# Patient Record
Sex: MALE | Race: WHITE | NOT HISPANIC OR LATINO | Employment: UNEMPLOYED | URBAN - METROPOLITAN AREA
[De-identification: names, ages, dates, MRNs, and addresses within clinical notes are randomized per-mention and may not be internally consistent; named-entity substitution may affect disease eponyms.]

---

## 2020-01-07 ENCOUNTER — TRANSFERRED RECORDS (OUTPATIENT)
Dept: HEALTH INFORMATION MANAGEMENT | Facility: CLINIC | Age: 40
End: 2020-01-07

## 2020-02-11 ENCOUNTER — TRANSFERRED RECORDS (OUTPATIENT)
Dept: HEALTH INFORMATION MANAGEMENT | Facility: CLINIC | Age: 40
End: 2020-02-11

## 2020-06-19 ENCOUNTER — TRANSFERRED RECORDS (OUTPATIENT)
Dept: HEALTH INFORMATION MANAGEMENT | Facility: CLINIC | Age: 40
End: 2020-06-19

## 2020-06-22 ENCOUNTER — TRANSCRIBE ORDERS (OUTPATIENT)
Dept: OTHER | Age: 40
End: 2020-06-22

## 2020-06-22 DIAGNOSIS — M50.30 DEGENERATIVE DISC DISEASE, CERVICAL: ICD-10-CM

## 2020-06-22 DIAGNOSIS — M47.812 CERVICAL SPONDYLOSIS: Primary | ICD-10-CM

## 2020-06-29 NOTE — TELEPHONE ENCOUNTER
DIAGNOSIS: video visit/Cervical spondylosis/Degenerative disc disease, cervical/CT scan/MRI at [facility] in [place], [place] and  in [place]/EMG/Pedro Luis Ellis MD/Shirley Barrera/bringing card/ortho con   APPOINTMENT DATE:    NOTES STATUS DETAILS   OFFICE NOTE from referring provider N/A    OFFICE NOTE from other specialist Care Everywhere    DISCHARGE SUMMARY from hospital N/A    DISCHARGE REPORT from the ER N/A    OPERATIVE REPORT N/A    MEDICATION LIST Care Everywhere    MRI recieved 9/4/2019   CT SCAN n/a    XRAYS (IMAGES & REPORTS) recieved 10/4/18  12/2/19     6/29/20  Called [facility] for imaging,no answer [facility]  Will call back shortly     6/30/20  Received imaging from [facility]

## 2020-06-30 ENCOUNTER — PRE VISIT (OUTPATIENT)
Dept: ORTHOPEDICS | Facility: CLINIC | Age: 40
End: 2020-06-30

## 2020-06-30 ENCOUNTER — VIRTUAL VISIT (OUTPATIENT)
Dept: ORTHOPEDICS | Facility: CLINIC | Age: 40
End: 2020-06-30

## 2020-06-30 VITALS — HEIGHT: 76 IN | BODY MASS INDEX: 32.88 KG/M2 | WEIGHT: 270 LBS

## 2020-06-30 DIAGNOSIS — M54.2 CERVICALGIA: Primary | ICD-10-CM

## 2020-06-30 DIAGNOSIS — M47.812 OSTEOARTHRITIS OF CERVICAL SPINE, UNSPECIFIED SPINAL OSTEOARTHRITIS COMPLICATION STATUS: ICD-10-CM

## 2020-06-30 RX ORDER — TRAMADOL HYDROCHLORIDE 50 MG/1
50 TABLET ORAL 3 TIMES DAILY
COMMUNITY
Start: 2019-12-02

## 2020-06-30 RX ORDER — CYCLOBENZAPRINE HCL 10 MG
10 TABLET ORAL PRN
COMMUNITY
Start: 2019-12-02

## 2020-06-30 RX ORDER — GABAPENTIN 100 MG/1
100 CAPSULE ORAL 3 TIMES DAILY
COMMUNITY
Start: 2019-12-02

## 2020-06-30 RX ORDER — AMITRIPTYLINE HYDROCHLORIDE 50 MG/1
25 TABLET ORAL AT BEDTIME
COMMUNITY
Start: 2020-05-19 | End: 2021-05-24

## 2020-06-30 RX ORDER — THYROID 60 MG/1
60 TABLET ORAL 3 TIMES DAILY
COMMUNITY

## 2020-06-30 RX ORDER — ROPINIROLE 5 MG/1
30 TABLET, FILM COATED ORAL DAILY
COMMUNITY

## 2020-06-30 ASSESSMENT — MIFFLIN-ST. JEOR: SCORE: 2246.21

## 2020-06-30 NOTE — PROGRESS NOTES
"Video Visit Technology for this patient: [employer] Video Visit- Patient was left in waiting room    Damian Lopez is a 39 year old male who is being evaluated via a billable video visit.      The patient has been notified of following:     \"This video visit will be conducted via a call between you and your physician/provider. We have found that certain health care needs can be provided without the need for an in-person physical exam.  This service lets us provide the care you need with a video conversation.  If a prescription is necessary we can send it directly to your pharmacy.  If lab work is needed we can place an order for that and you can then stop by our lab to have the test done at a later time.    Video visits are billed at different rates depending on your insurance coverage.  Please reach out to your insurance provider with any questions.    If during the course of the call the physician/provider feels a video visit is not appropriate, you will not be charged for this service.\"    Patient has given verbal consent for Video visit? Yes  How would you like to obtain your AVS? [employer]    Video-Visit Details    Type of service:  Video Visit    Video Start:  2:22  Video Stop:  2:48    Due to poor video connection visit finished by phone [phone]  Phone start:  2:57  Phone stop:  3:11      Subjective: 39-year male referred to orthopedic spine department [facility] for second opinion regarding cervicalgia from his primary providers in [place].  He presents to sports medicine for a virtual visit.  Chronic history of neck and upper back discomfort since at least January 2019 per chart review.  When present he will feel it in the posterior neck and upper back with episodes of radiating pain into the bilateral upper extremities that can be associated with numbness and tingling. No history of cervical spine injury.  At times pain has been debilitating with multiple visits to urgent care and " "emergency room and hospitalization for pain control 1/20/2020.  During this time has been seeing Dr Mcdaniels at a pain clinic in [place], [place] and he indicates that he has had \"8 injections in his neck\" over time.  By chart review this includes an epidural steroid injection in the cervical spine in 2019, that was felt to be helpful at the time according to chart review.  There is indication of a left-sided C2-C3 facet injection being done 4/29/2020 at the pain clinic in [place].  In addition he has had chiropractic care, physical therapy visits, traction, has used tramadol, Flexeril and gabapentin, amitryptyline at bedtime. He indicates intolerance to Topomax and Rizatriptan.  He has had trigger point injections to the neck and upper back.  Through [facility] he has consulted with physical medicine and rehabilitation, neurosurgery, rheumatology, pain clinic.    Cervical spine MRI and brain MRI through [facility] 6/27/2019 were reviewed at [facility] by physical medicine rehabilitation and neurosurgery.  Reports and images are not available to me; they are pending.  Physical medicine and rehabilitation clinic note from 8/12/2019 indicated degenerative disc changes at C5-C6, and C6-C7 with bilateral neural foraminal stenosis noted.  Patient went on to have a x-ray thoracic spine that showed mild degenerative changes at T12, but was otherwise negative.  There is a report of additional cervical spine x-ray 5/19/2020, an additional CT scan of the head 5/19/20, but results are not available to me.  He had an EMG of the bilateral upper extremities September 2019 that according to clinic notes show mild bilateral median neuropathy.    During this time period he has been also evaluated by his primary physicians and pain clinic for episodes of low back pain, as well as chest discomfort, recurrent headaches.  Chart review indicates multiple lumbar spine injections in the past. He had a normal lumbar puncture and " normal cardiac work-up during a pain control hospital admission 2020. CT ABD without contrast noncontributory 19.  Normal urinalysis 2019, mildly positive Shaina which was reviewed by Rheumatology.  He has a history of hypothyroidism on replacement medicines, felt to be due to Hashimoto's, and recent laboratory values 3/20 indicate thyroid disease with suboptimal control.  Normal CBC 2019.    Patient has been a .  Currently on disability. Plays guitar.    Past medical history.  Donated left kidney .  Hypothyroidism.    Family history: Hodgkin's disease.  Mother cerebrovascular disease.        XRAY SPINE CERVICAL 4-5 VIEWS10/  [facility]  Result Narrative   **FINAL REPORT**  Ordering Location:  [facility]    Patient Name: YAO SMITH   CSN: [id number]   : 1980   Ordering Physician: GALINA BIRMINGHAM   MRN: [id number]   MARISOL:    Accession: [id number]   Order: [id number]   Procedure: XRAY SPINE CERVICAL 4-5 VIEWS   Procedure Date/Time: 10/14/2018 null:null    EXAM:    XR Cervical Spine, 4 or 5 Views    CLINICAL HISTORY:    38 years old, male; Cervicalgia; Pain; Neck pain    TECHNIQUE:    Frontal, lateral and oblique views of the cervical spine.    COMPARISON:    No relevant prior studies available.    FINDINGS:    Vertebrae:  Unremarkable.  No acute fracture.  Normal alignment.    Disc spaces:  Mild degenerative change without fracture or subluxation.   Normal alignment.    Soft tissues:  Unremarkable.    IMPRESSION:         Mild degenerative change without fracture or subluxation. Normal alignment.  This report was electronically signed by Virtual Radiologic physician:   Aaron Villela MD. on 10/14/2018 at 18:16, Central Standard Time.           XRAY SPINE THORACIC 3 VIEWS2019  [facility]  Result Narrative      Patient Name:   YAO MSITH    YOB: 1980    Procedure: XRAY SPINE THORACIC 3 VIEWS    Date of  Service: 12/02/2019       EXAM: XRAY SPINE THORACIC 3 VIEWS    INDICATION:ICD-10 M54.6 Acute thoracic back pain, unspecified back pain laterality    COMPARISON(S):  None Available    FINDINGS:  Satisfactory alignment. Very slight chronic loss of height of the T12 vertebral body unchanged since lateral chest radiograph of 10/24/2016 with prominent osteophytes projecting anteriorly from this vertebral body and lesser osteophyte formation at the T11 and L1 levels. Mild disc space narrowing T12-L1. The other vertebral body heights and disc spaces appear satisfactorily maintained. No acute fractures or dislocations evident.    IMPRESSION:  Very slight chronic loss of height of the T12 vertebral body with moderate degenerative changes lower lumbar spine but no acute bony changes evident.           MRI SPINE LUMBAR WITHOUT CONTRAST9/4/2019  [facility]  Result Narrative      Patient Name:   YAO SMITH    YOB: 1980    Procedure: MRI SPINE LUMBAR WITHOUT CONTRAST    Date of Service: 09/04/2019       EXAM: MRI SPINE LUMBAR WITHOUT CONTRAST    INDICATION: ICD-10 R20.0 Numbness and tingling of both lower extremities  ICD-10 R20.2 Numbness and tingling of both lower extremities  ICD-10 R29.898 Left leg weakness  left quad weakness, bilateral numbness/ tingling of legs     COMPARISON: None    TECHNIQUE:  Multiplanar multisequence MR imaging of the lumbar spine was performed without IV contrast.     FINDINGS:  The conus medullaris appears satisfactory. There is decreased signal on T2 images in the disks each level from L3 through S1 indicative disc desiccation.    At the T12-L1 level the disc mildly bulges diffusely without evidence of spinal stenosis or neural foraminal stenosis.    At the L3-4 level the disc mildly protrudes across the ventral aspect of thecal sac. This mildly efface the thecal sac and mildly encroaches on the left neural foramen. The right neural foramen appears  satisfactory. Spinal stenosis is not present. Bright signal at the posterior margin of the disc centrally and on the left suggest annular tear.    At the L4-5 level the disc mildly protrudes across the ventral aspect of thecal sac. Bright signal at the posterior margin disc suggests annular tear. This protrusion does not cause significant effacement of thecal sac. Spinal stenosis or neural foraminal stenosis not thought to be present.    At the L5-S1 level there is disc space narrowing present. There is mild to moderate disc protrusion extending across the ventral aspect of thecal sac. There are associated osteophytes. These do not appear to cause significant effacement of thecal sac. Spinal stenosis is not present. There is moderate narrowing of the neural foramen bilaterally.    IMPRESSION: There is disc disease and degenerative change in the lumbar spine as discussed above.           MRI BRAIN W AND WITHOUT CONTRAST6/3/2008  [facility]  Other Result Information   Bartolo Wharton MD - 2008  3:10 PM CDT   [facility] RADIOLOGY   [place], [place]   RADIOLOGY REPORT     Facility: [facility]   Name: YAO SMITH   : Y   Order #: [id number]    Ord. Phys: CRYSTAL MCGOWAN    MRI BRAIN W AND WITHOUT CONTRAST:   2008 07:38 MRI EXAMINATION OF THE BRAIN WITH AND WITHOUT    CONTRAST:      INDICATION:    Generalized headaches for the past 2 years.      IMAGING:    The exam was conducted a 1.5 marj magnet prior to and subsequent to IV    injection of 20 cc Omniscan. Sagittal, axial and coronal images were    obtained.      FINDINGS:    There is no evidence of mass effect, edema, infarct or demyelinating    process. The brain substance appears unremarkable. There is no    abnormal enhancement of the brain or meninges. No vascular anomaly is    present.      The ventricular system is normal in caliber and position.      The orbital, parasellar, IAC,  mastoid and clivus regions all appear    within normal limits. The position of the cerebellar tonsils is    normal.      There is a large retention cyst in the floor of the left maxillary    sinus.      IMPRESSION:    Normal exam except for retention cyst in the floor of the left    maxillary sinus.          FEDERICO CAMERON M.D. [[id number]]   [facility] - [phone]     Electronically Signed by FEDERICO CAMERON M.D.    on 06/03/2008 15:10:25       [id number]   DD: 06/03/2008 14:58:33   DT: 06/03/2008 15:05:02         GENERAL: healthy, alert, without distress  EYES: Eyes grossly normal to inspection.   HENT: normal cephalic/atraumatic  NECK: No asymmetry, visible masses or scars  RESP: No audible wheeze, cough, or visible cyanosis.  No visible retractions or increased work of breathing.    SKIN: Visible skin clear. No visible rash, abnormal pigmentation or lesions.  NEURO: Cranial nerves grossly intact.  Mentation and speech appropriate for age.  PSYCH: mentation appears normal, concentration appears appropriate, judgement and insight intact and appearance well groomed  MSK: Protects his neck in forward flexion and in side to side rotation due to concerns of discomfort    A:  Cervicalgia, chronic.  Cervical spine ddd.  Chronic pain syndrome.  Chronic headaches.  Hypothyroidism.      P:  Pt and referring doctor have requested orthopedic spine consult.  Pt believes there are updated cervical spine and lumbar spine MRIs within the last 3 months from [facility].  If this is true, once these images can be transferred, the pt will see orthopedic spine in consultation. If no updated cervical spine MRI can be located, he would need a new cervical spine MRI prior to his consultation with spine surgery.  Potentially this could be done in [facility] and transferred here digitally to be available for a virtual visit with ortho spine surgery, Dr. Bethea or Dr. Woodward.  His last cervical MRI is more than one  year old.  In the meantime I encouraged him to discuss his hypothyroidism with his primary provider.  The pt indicates he is not consistently taking his thyroid medicines.  Encouraged him to readdress his chronic daily headache with his Neurologist, whom pt said he last saw 3 months ago.  Encouraged him to explore the mental health aspects of pain control offered by pain clinic in [place]. [place].

## 2020-06-30 NOTE — LETTER
"6/30/2020       RE: Damian Lopez  [address]  [place] [address]     Dear Colleague,    Thank you for referring your patient, Damian Lopez, to the [facility] at [facility]. Please see a copy of my visit note below.    .      Video Visit Technology for this patient: [employer] Video Visit- Patient was left in waiting room    Damian Lopez is a 39 year old male who is being evaluated via a billable video visit.      The patient has been notified of following:     \"This video visit will be conducted via a call between you and your physician/provider. We have found that certain health care needs can be provided without the need for an in-person physical exam.  This service lets us provide the care you need with a video conversation.  If a prescription is necessary we can send it directly to your pharmacy.  If lab work is needed we can place an order for that and you can then stop by our lab to have the test done at a later time.    Video visits are billed at different rates depending on your insurance coverage.  Please reach out to your insurance provider with any questions.    If during the course of the call the physician/provider feels a video visit is not appropriate, you will not be charged for this service.\"    Patient has given verbal consent for Video visit? Yes  How would you like to obtain your AVS? [employer]    Video-Visit Details    Type of service:  Video Visit    Video Start:  2:22  Video Stop:  2:48    Due to poor video connection visit finished by phone [phone]  Phone start:  2:57  Phone stop:  3:11      Subjective: 39-year male referred to orthopedic spine department [facility] for second opinion regarding cervicalgia from his primary providers in [place].  He presents to sports medicine for a virtual visit.  Chronic history of neck and upper back discomfort since at least January 2019 per chart review.  When present " "he will feel it in the posterior neck and upper back with episodes of radiating pain into the bilateral upper extremities that can be associated with numbness and tingling. No history of cervical spine injury.  At times pain has been debilitating with multiple visits to urgent care and emergency room and hospitalization for pain control 1/20/2020.  During this time has been seeing Dr Mcdaniels at a pain clinic in [place], [place] and he indicates that he has had \"8 injections in his neck\" over time.  By chart review this includes an epidural steroid injection in the cervical spine in 2019, that was felt to be helpful at the time according to chart review.  There is indication of a left-sided C2-C3 facet injection being done 4/29/2020 at the pain clinic in [place].  In addition he has had chiropractic care, physical therapy visits, traction, has used tramadol, Flexeril and gabapentin, amitryptyline at bedtime. He indicates intolerance to Topomax and Rizatriptan.  He has had trigger point injections to the neck and upper back.  Through [facility] he has consulted with physical medicine and rehabilitation, neurosurgery, rheumatology, pain clinic.    Cervical spine MRI and brain MRI through [facility] 6/27/2019 were reviewed at [facility] by physical medicine rehabilitation and neurosurgery.  Reports and images are not available to me; they are pending.  Physical medicine and rehabilitation clinic note from 8/12/2019 indicated degenerative disc changes at C5-C6, and C6-C7 with bilateral neural foraminal stenosis noted.  Patient went on to have a x-ray thoracic spine that showed mild degenerative changes at T12, but was otherwise negative.  There is a report of additional cervical spine x-ray 5/19/2020, an additional CT scan of the head 5/19/20, but results are not available to me.  He had an EMG of the bilateral upper extremities September 2019 that according to clinic notes show mild bilateral median " neuropathy.    During this time period he has been also evaluated by his primary physicians and pain clinic for episodes of low back pain, as well as chest discomfort, recurrent headaches.  Chart review indicates multiple lumbar spine injections in the past. He had a normal lumbar puncture and normal cardiac work-up during a pain control hospital admission 2020. CT ABD without contrast noncontributory 19.  Normal urinalysis 2019, mildly positive Shaina which was reviewed by Rheumatology.  He has a history of hypothyroidism on replacement medicines, felt to be due to Hashimoto's, and recent laboratory values 3/20 indicate thyroid disease with suboptimal control.  Normal CBC 2019.    Patient has been a .  Currently on disability. Plays [employer].    Past medical history.  Donated left kidney .  Hypothyroidism.    Family history: Hodgkin's disease.  Mother cerebrovascular disease.        XRAY SPINE CERVICAL 4-5 VIEWS10/  [facility]  Result Narrative   **FINAL REPORT**  Ordering Location:  [facility]    Patient Name: YAO SMITH   CSN: [id number]   : 1980   Ordering Physician: GALINA BIRMINGHAM   MRN: [id number]   MARISOL:    Accession: [id number]   Order: [id number]   Procedure: XRAY SPINE CERVICAL 4-5 VIEWS   Procedure Date/Time: 10/14/2018 null:null    EXAM:    XR Cervical Spine, 4 or 5 Views    CLINICAL HISTORY:    38 years old, male; Cervicalgia; Pain; Neck pain    TECHNIQUE:    Frontal, lateral and oblique views of the cervical spine.    COMPARISON:    No relevant prior studies available.    FINDINGS:    Vertebrae:  Unremarkable.  No acute fracture.  Normal alignment.    Disc spaces:  Mild degenerative change without fracture or subluxation.   Normal alignment.    Soft tissues:  Unremarkable.    IMPRESSION:         Mild degenerative change without fracture or subluxation. Normal alignment.  This report was electronically signed by Virtual Radiologic physician:    Aaron Villela MD. on 10/14/2018 at 18:16, Central Standard Time.           XRAY SPINE THORACIC 3 VIEWS12/2/2019  [facility]  Result Narrative      Patient Name:   YAO SMITH    YOB: 1980    Procedure: XRAY SPINE THORACIC 3 VIEWS    Date of Service: 12/02/2019       EXAM: XRAY SPINE THORACIC 3 VIEWS    INDICATION:ICD-10 M54.6 Acute thoracic back pain, unspecified back pain laterality    COMPARISON(S):  None Available    FINDINGS:  Satisfactory alignment. Very slight chronic loss of height of the T12 vertebral body unchanged since lateral chest radiograph of 10/24/2016 with prominent osteophytes projecting anteriorly from this vertebral body and lesser osteophyte formation at the T11 and L1 levels. Mild disc space narrowing T12-L1. The other vertebral body heights and disc spaces appear satisfactorily maintained. No acute fractures or dislocations evident.    IMPRESSION:  Very slight chronic loss of height of the T12 vertebral body with moderate degenerative changes lower lumbar spine but no acute bony changes evident.           MRI SPINE LUMBAR WITHOUT CONTRAST9/4/2019  [facility]  Result Narrative      Patient Name:   YAO SMITH    YOB: 1980    Procedure: MRI SPINE LUMBAR WITHOUT CONTRAST    Date of Service: 09/04/2019       EXAM: MRI SPINE LUMBAR WITHOUT CONTRAST    INDICATION: ICD-10 R20.0 Numbness and tingling of both lower extremities  ICD-10 R20.2 Numbness and tingling of both lower extremities  ICD-10 R29.898 Left leg weakness  left quad weakness, bilateral numbness/ tingling of legs     COMPARISON: None    TECHNIQUE:  Multiplanar multisequence MR imaging of the lumbar spine was performed without IV contrast.     FINDINGS:  The conus medullaris appears satisfactory. There is decreased signal on T2 images in the disks each level from L3 through S1 indicative disc desiccation.    At the T12-L1 level the disc mildly  bulges diffusely without evidence of spinal stenosis or neural foraminal stenosis.    At the L3-4 level the disc mildly protrudes across the ventral aspect of thecal sac. This mildly efface the thecal sac and mildly encroaches on the left neural foramen. The right neural foramen appears satisfactory. Spinal stenosis is not present. Bright signal at the posterior margin of the disc centrally and on the left suggest annular tear.    At the L4-5 level the disc mildly protrudes across the ventral aspect of thecal sac. Bright signal at the posterior margin disc suggests annular tear. This protrusion does not cause significant effacement of thecal sac. Spinal stenosis or neural foraminal stenosis not thought to be present.    At the L5-S1 level there is disc space narrowing present. There is mild to moderate disc protrusion extending across the ventral aspect of thecal sac. There are associated osteophytes. These do not appear to cause significant effacement of thecal sac. Spinal stenosis is not present. There is moderate narrowing of the neural foramen bilaterally.    IMPRESSION: There is disc disease and degenerative change in the lumbar spine as discussed above.           MRI BRAIN W AND WITHOUT CONTRAST6/3/2008  [facility]  Other Result Information   Bartolo Wharton MD - 2008  3:10 PM CDT   [facility] RADIOLOGY   [place], [place]   RADIOLOGY REPORT     Facility: [facility]   Name: YAO SMITH   : 1980/Y   Order #: [id number]    Ord. Phys: CRYSTAL MCGOWAN    MRI BRAIN W AND WITHOUT CONTRAST:   2008 07:38 MRI EXAMINATION OF THE BRAIN WITH AND WITHOUT    CONTRAST:      INDICATION:    Generalized headaches for the past 2 years.      IMAGING:    The exam was conducted a 1.5 marj magnet prior to and subsequent to IV    injection of 20 cc Omniscan. Sagittal, axial and coronal images were    obtained.      FINDINGS:    There is no evidence of mass  effect, edema, infarct or demyelinating    process. The brain substance appears unremarkable. There is no    abnormal enhancement of the brain or meninges. No vascular anomaly is    present.      The ventricular system is normal in caliber and position.      The orbital, parasellar, IAC, mastoid and clivus regions all appear    within normal limits. The position of the cerebellar tonsils is    normal.      There is a large retention cyst in the floor of the left maxillary    sinus.      IMPRESSION:    Normal exam except for retention cyst in the floor of the left    maxillary sinus.          FEDERICO CAMERON M.D. [[id number]]   [facility] - [phone]     Electronically Signed by FEDERICO CAMERON M.D.    on 06/03/2008 15:10:25       [id number]   DD: 06/03/2008 14:58:33   DT: 06/03/2008 15:05:02         GENERAL: healthy, alert, without distress  EYES: Eyes grossly normal to inspection.   HENT: normal cephalic/atraumatic  NECK: No asymmetry, visible masses or scars  RESP: No audible wheeze, cough, or visible cyanosis.  No visible retractions or increased work of breathing.    SKIN: Visible skin clear. No visible rash, abnormal pigmentation or lesions.  NEURO: Cranial nerves grossly intact.  Mentation and speech appropriate for age.  PSYCH: mentation appears normal, concentration appears appropriate, judgement and insight intact and appearance well groomed  MSK: Protects his neck in forward flexion and in side to side rotation due to concerns of discomfort    A:  Cervicalgia, chronic.  Cervical spine ddd.  Chronic pain syndrome.  Chronic headaches.  Hypothyroidism.      P:  Pt and referring doctor have requested orthopedic spine consult.  Pt believes there are updated cervical spine and lumbar spine MRIs within the last 3 months from [facility].  If this is true, once these images can be transferred, the pt will see orthopedic spine in consultation. If no updated cervical spine MRI can be located, he  would need a new cervical spine MRI prior to his consultation with spine surgery.  Potentially this could be done in [facility] and transferred here digitally to be available for a virtual visit with ortho spine surgery, Dr. Bethea or Dr. Woodward.  His last cervical MRI is more than one year old.  In the meantime I encouraged him to discuss his hypothyroidism with his primary provider.  The pt indicates he is not consistently taking his thyroid medicines.  Encouraged him to readdress his chronic daily headache with his Neurologist, whom pt said he last saw 3 months ago.  Encouraged him to explore the mental health aspects of pain control offered by pain clinic in [place]. [place].          Again, thank you for allowing me to participate in the care of your patient.      Sincerely,    Ned Lindsay MD

## 2020-06-30 NOTE — LETTER
"  6/30/2020      RE: Damian Lopez  [address]  [facility] [address]       .      Video Visit Technology for this patient: AmWell Video Visit- Patient was left in waiting room    Damian Lopez is a 39 year old male who is being evaluated via a billable video visit.      The patient has been notified of following:     \"This video visit will be conducted via a call between you and your physician/provider. We have found that certain health care needs can be provided without the need for an in-person physical exam.  This service lets us provide the care you need with a video conversation.  If a prescription is necessary we can send it directly to your pharmacy.  If lab work is needed we can place an order for that and you can then stop by our lab to have the test done at a later time.    Video visits are billed at different rates depending on your insurance coverage.  Please reach out to your insurance provider with any questions.    If during the course of the call the physician/provider feels a video visit is not appropriate, you will not be charged for this service.\"    Patient has given verbal consent for Video visit? Yes  How would you like to obtain your AVS? Candy Labhart    Video-Visit Details    Type of service:  Video Visit    Video Start:  2:22  Video Stop:  2:48    Due to poor video connection visit finished by phone [phone]  Phone start:  2:57  Phone stop:  3:11      Subjective: 39-year male referred to orthopedic spine department [facility] for second opinion regarding cervicalgia from his primary providers in [place].  He presents to sports medicine for a virtual visit.  Chronic history of neck and upper back discomfort since at least January 2019 per chart review.  When present he will feel it in the posterior neck and upper back with episodes of radiating pain into the bilateral upper extremities that can be associated with numbness and tingling. No history of cervical spine " "injury.  At times pain has been debilitating with multiple visits to urgent care and emergency room and hospitalization for pain control 1/20/2020.  During this time has been seeing Dr Mcdaniels at a pain clinic in [place], [place] and he indicates that he has had \"8 injections in his neck\" over time.  By chart review this includes an epidural steroid injection in the cervical spine in 2019, that was felt to be helpful at the time according to chart review.  There is indication of a left-sided C2-C3 facet injection being done 4/29/2020 at the pain clinic in [place].  In addition he has had chiropractic care, physical therapy visits, traction, has used tramadol, Flexeril and gabapentin, amitryptyline at bedtime. He indicates intolerance to Topomax and Rizatriptan.  He has had trigger point injections to the neck and upper back.  Through [facility] he has consulted with physical medicine and rehabilitation, neurosurgery, rheumatology, pain clinic.    Cervical spine MRI and brain MRI through [facility] 6/27/2019 were reviewed at [facility] by physical medicine rehabilitation and neurosurgery.  Reports and images are not available to me; they are pending.  Physical medicine and rehabilitation clinic note from 8/12/2019 indicated degenerative disc changes at C5-C6, and C6-C7 with bilateral neural foraminal stenosis noted.  Patient went on to have a x-ray thoracic spine that showed mild degenerative changes at T12, but was otherwise negative.  There is a report of additional cervical spine x-ray 5/19/2020, an additional CT scan of the head 5/19/20, but results are not available to me.  He had an EMG of the bilateral upper extremities September 2019 that according to clinic notes show mild bilateral median neuropathy.    During this time period he has been also evaluated by his primary physicians and pain clinic for episodes of low back pain, as well as chest discomfort, recurrent headaches.  Chart review " indicates multiple lumbar spine injections in the past. He had a normal lumbar puncture and normal cardiac work-up during a pain control hospital admission 2020. CT ABD without contrast noncontributory 19.  Normal urinalysis 2019, mildly positive Shaina which was reviewed by Rheumatology.  He has a history of hypothyroidism on replacement medicines, felt to be due to Hashimoto's, and recent laboratory values 3/20 indicate thyroid disease with suboptimal control.  Normal CBC 2019.    Patient has been a .  Currently on disability. Plays [employer].    Past medical history.  Donated left kidney .  Hypothyroidism.    Family history: Hodgkin's disease.  Mother cerebrovascular disease.        XRAY SPINE CERVICAL 4-5 VIEWS10/  [facility]  Result Narrative   **FINAL REPORT**  Ordering Location:  [facility]    Patient Name: YAO SMITH   CSN: [id number]   : 1980   Ordering Physician: GALINA BIRMINGHAM   MRN: [id number]   MARISOL:    Accession: [id number]   Order: [id number]   Procedure: XRAY SPINE CERVICAL 4-5 VIEWS   Procedure Date/Time: 10/14/2018 null:null    EXAM:    XR Cervical Spine, 4 or 5 Views    CLINICAL HISTORY:    38 years old, male; Cervicalgia; Pain; Neck pain    TECHNIQUE:    Frontal, lateral and oblique views of the cervical spine.    COMPARISON:    No relevant prior studies available.    FINDINGS:    Vertebrae:  Unremarkable.  No acute fracture.  Normal alignment.    Disc spaces:  Mild degenerative change without fracture or subluxation.   Normal alignment.    Soft tissues:  Unremarkable.    IMPRESSION:         Mild degenerative change without fracture or subluxation. Normal alignment.  This report was electronically signed by Virtual Radiologic physician:   Aaron Villela MD. on 10/14/2018 at 18:16, Central Standard Time.           XRAY SPINE THORACIC 3 VIEWS2019  [facility]  Result Narrative      Patient Name:   YAO SMITH  JOSE    YOB: 1980    Procedure: XRAY SPINE THORACIC 3 VIEWS    Date of Service: 12/02/2019       EXAM: XRAY SPINE THORACIC 3 VIEWS    INDICATION:ICD-10 M54.6 Acute thoracic back pain, unspecified back pain laterality    COMPARISON(S):  None Available    FINDINGS:  Satisfactory alignment. Very slight chronic loss of height of the T12 vertebral body unchanged since lateral chest radiograph of 10/24/2016 with prominent osteophytes projecting anteriorly from this vertebral body and lesser osteophyte formation at the T11 and L1 levels. Mild disc space narrowing T12-L1. The other vertebral body heights and disc spaces appear satisfactorily maintained. No acute fractures or dislocations evident.    IMPRESSION:  Very slight chronic loss of height of the T12 vertebral body with moderate degenerative changes lower lumbar spine but no acute bony changes evident.           MRI SPINE LUMBAR WITHOUT CONTRAST9/4/2019  [facility]  Result Narrative      Patient Name:   YAO SMITH    YOB: 1980    Procedure: MRI SPINE LUMBAR WITHOUT CONTRAST    Date of Service: 09/04/2019       EXAM: MRI SPINE LUMBAR WITHOUT CONTRAST    INDICATION: ICD-10 R20.0 Numbness and tingling of both lower extremities  ICD-10 R20.2 Numbness and tingling of both lower extremities  ICD-10 R29.898 Left leg weakness  left quad weakness, bilateral numbness/ tingling of legs     COMPARISON: None    TECHNIQUE:  Multiplanar multisequence MR imaging of the lumbar spine was performed without IV contrast.     FINDINGS:  The conus medullaris appears satisfactory. There is decreased signal on T2 images in the disks each level from L3 through S1 indicative disc desiccation.    At the T12-L1 level the disc mildly bulges diffusely without evidence of spinal stenosis or neural foraminal stenosis.    At the L3-4 level the disc mildly protrudes across the ventral aspect of thecal sac. This mildly efface the thecal  sac and mildly encroaches on the left neural foramen. The right neural foramen appears satisfactory. Spinal stenosis is not present. Bright signal at the posterior margin of the disc centrally and on the left suggest annular tear.    At the L4-5 level the disc mildly protrudes across the ventral aspect of thecal sac. Bright signal at the posterior margin disc suggests annular tear. This protrusion does not cause significant effacement of thecal sac. Spinal stenosis or neural foraminal stenosis not thought to be present.    At the L5-S1 level there is disc space narrowing present. There is mild to moderate disc protrusion extending across the ventral aspect of thecal sac. There are associated osteophytes. These do not appear to cause significant effacement of thecal sac. Spinal stenosis is not present. There is moderate narrowing of the neural foramen bilaterally.    IMPRESSION: There is disc disease and degenerative change in the lumbar spine as discussed above.           MRI BRAIN W AND WITHOUT CONTRAST6/3/2008  [facility]  Other Result Information   Bartolo Wharton MD - 2008  3:10 PM CDT   [facility] RADIOLOGY   [place], [place]   RADIOLOGY REPORT     Facility: [facility]   Name: YAO SMITH   : 1980/27Y   Order #: [id number]    Ord. Phys: CRYSTAL MCGOWAN    MRI BRAIN W AND WITHOUT CONTRAST:   2008 07:38 MRI EXAMINATION OF THE BRAIN WITH AND WITHOUT    CONTRAST:      INDICATION:    Generalized headaches for the past 2 years.      IMAGING:    The exam was conducted a 1.5 marj magnet prior to and subsequent to IV    injection of 20 cc Omniscan. Sagittal, axial and coronal images were    obtained.      FINDINGS:    There is no evidence of mass effect, edema, infarct or demyelinating    process. The brain substance appears unremarkable. There is no    abnormal enhancement of the brain or meninges. No vascular anomaly is    present.      The  ventricular system is normal in caliber and position.      The orbital, parasellar, IAC, mastoid and clivus regions all appear    within normal limits. The position of the cerebellar tonsils is    normal.      There is a large retention cyst in the floor of the left maxillary    sinus.      IMPRESSION:    Normal exam except for retention cyst in the floor of the left    maxillary sinus.          FEDERICO CAMERON M.D. [[id number]]   [facility] - [phone]     Electronically Signed by FEDERICO CAMERON M.D.    on 06/03/2008 15:10:25       [id number]//[id number]   DD: 06/03/2008 14:58:33   DT: 06/03/2008 15:05:02         GENERAL: healthy, alert, without distress  EYES: Eyes grossly normal to inspection.   HENT: normal cephalic/atraumatic  NECK: No asymmetry, visible masses or scars  RESP: No audible wheeze, cough, or visible cyanosis.  No visible retractions or increased work of breathing.    SKIN: Visible skin clear. No visible rash, abnormal pigmentation or lesions.  NEURO: Cranial nerves grossly intact.  Mentation and speech appropriate for age.  PSYCH: mentation appears normal, concentration appears appropriate, judgement and insight intact and appearance well groomed  MSK: Protects his neck in forward flexion and in side to side rotation due to concerns of discomfort    A:  Cervicalgia, chronic.  Cervical spine ddd.  Chronic pain syndrome.  Chronic headaches.  Hypothyroidism.      P:  Pt and referring doctor have requested orthopedic spine consult.  Pt believes there are updated cervical spine and lumbar spine MRIs within the last 3 months from [facility].  If this is true, once these images can be transferred, the pt will see orthopedic spine in consultation. If no updated cervical spine MRI can be located, he would need a new cervical spine MRI prior to his consultation with spine surgery.  Potentially this could be done in [facility] and transferred here digitally to be available for a virtual visit with  ortho spine surgery, Dr. Bethea or Dr. Woodward.  His last cervical MRI is more than one year old.  In the meantime I encouraged him to discuss his hypothyroidism with his primary provider.  The pt indicates he is not consistently taking his thyroid medicines.  Encouraged him to readdress his chronic daily headache with his Neurologist, whom pt said he last saw 3 months ago.  Encouraged him to explore the mental health aspects of pain control offered by pain clinic in [place]. [place].          Ned Lindsay MD

## 2020-07-16 ENCOUNTER — VIRTUAL VISIT (OUTPATIENT)
Dept: ORTHOPEDICS | Facility: CLINIC | Age: 40
End: 2020-07-16

## 2020-07-16 DIAGNOSIS — M54.2 CERVICALGIA: Primary | ICD-10-CM

## 2020-07-16 DIAGNOSIS — M48.02 CERVICAL STENOSIS OF SPINE: ICD-10-CM

## 2020-07-16 DIAGNOSIS — R20.0 BILATERAL HAND NUMBNESS: ICD-10-CM

## 2020-07-16 DIAGNOSIS — M47.812 CERVICAL SPONDYLOSIS: ICD-10-CM

## 2020-07-16 NOTE — PROGRESS NOTES
"Patient is evaluated today via billable video visit.    The patient has been notified of following:   \"This video visit will be conducted via a call between you and your physician/provider. We have found that certain health care needs can be provided without the need for an in-person physical exam.  This service lets us provide the care you need with a video conversation.  If a prescription is necessary we can send it directly to your pharmacy.  If lab work is needed we can place an order for that and you can then stop by our lab to have the test done at a later time.  If during the course of the call the physician/provider feels a video visit is not appropriate, you will not be charged for this service.\"     Physician has received verbal consent for a Video Visit from the patient.  Video software/leona used:  [employer]  Video time:  3:10pm to 3:42pm  Originating Location (pt. Location): Home  Distant Location (provider location):  home       REASON FOR CONSULTATION: Consult (Cervicalgia)     REFERRING PHYSICIAN: Ned Lindsay   PCP:No primary care provider on file.    History of Present Illness:  40/m, ambidextrous, referred by Dr. Lindsay primarily for neck pain.    From Dr. Lindsay's note (6/30/2020), patient had already undergone significant and exhaustive nonoperative treatment, with evaluations by different specialties, and continues to have significant disabling pain:  - Per patient, has had approximately 8 injections into his neck, performed by Dr. Mcdaniels at a pain clinic in [facility].  This includes a cervical RAJ in 2019.  Also left C2-3 facet injection on 4/29/2020.  -Has had trigger point injections to the neck and upper back.  -Had been seen by PM&R, neurosurgery, rheumatology and pain clinic.  - Has had multiple visits to urgent care and emergency room, with hospitalization for pain control on 1/20/2020.  - Has also had chiropractic care, physical therapy, traction.  - Has used tramadol, " "ibuprofen, Flexeril, gabapentin and amitriptyline.  Indicates intolerance to Topamax and Rizatriptan.    Per patient, had been experiencing significant neck pain x at least 1 year.  No trauma; no precipitating incident.  Came about gradually.  Getting worse over time.    Neck 70%, Arm 30% - tingling and numbness of bilateral fingertips to wrists.  Occasionally (3x over the past year), his hands/fingers would bend/cramp in an awkward position.  When he tries to move his neck, feels like there is gravel in there.  Worse:  Neck motion - also causes headaches.  Better:  nothing    Previous treament:  See above.  Per patient, the very first injection seemed to have helped signficantly, although worn off by the following day.  None of the abovementioned meds help.  Had previously tried opioids, but they did not help either; so currently not on opioids.    PSHx:  Had done construction and concrete x 25 years.  Had to stop working early December 2019 because of current symptoms (neck and back).  Was on short-term disability until 2 weeks ago.  Trying to get into long-term disability.  , lives with wife and 3 children (ages 12, 9 and 7 mos).  Started smoking about a month ago; approx 3-4 cigarettes/day.  Hardly ever drink EtOH.    PMHx:  (+) Hashimoto's      Neck Disability Index (NDI) Questionnaire    Neck Disability Index (NDI) 7/16/2020   Neck Disability Index: Count 10   NDI: Total Score = SUM (points for all 10 findings) 37   Neck Disability in Percent = (Total Score) / 50 * 100 74 (%)        ROS: A 12-point review of systems was completed and is negative except for otherwise noted above in the history of present illness.    Med Hx:  No past medical history on file.    Surg Hx:  No past surgical history on file.    Allergies:  Allergies   Allergen Reactions     Topiramate Other (See Comments)     \"almost had stroke the last time I took it\"     Rizatriptan Other (See Comments)     Loss of consciousness resulting " in hospital admission       Meds:  Current Outpatient Medications   Medication     amitriptyline (ELAVIL) 50 MG tablet     cyclobenzaprine (FLEXERIL) 10 MG tablet     gabapentin (NEURONTIN) 100 MG capsule     OMEPRAZOLE PO     rOPINIRole (REQUIP) 5 MG tablet     thyroid (ARMOUR) 60 MG tablet     traMADol (ULTRAM) 50 MG tablet     No current facility-administered medications for this visit.        Fam Hx:  No family history on file.    P/S Hx:  Social History     Tobacco Use     Smoking status: Current Every Day Smoker     Smokeless tobacco: Never Used   Substance Use Topics     Alcohol use: Not on file       Physical Examination:    This was a video visit, so very limited exam could be performed.  On video, patient appeared alert, oriented x 3, cooperative, with coherent speech, and not in cardiorespiratory distress.  Able to respond to questions appropriately and follow instructions.          Imaging:   Cervical MRI from 2/25/2020 reviewed.  Mild multilevel cervical spondylosis, with mild spinal stenosis C5-6 and C6-7, milder at C3-4.  However, difficult to tell if this is symptomatic or not.  There is no spinal cord deformation; no cord signal change or myelomalacia.    Thoracic and lumbar MRI from 2/25/2020 reviewed.  Shows diffuse spondylosis or degenerative changes in the thoracic and lumbar spine.  However, no significant stenosis or evidence of spinal cord or nerve root impingement.    Cervical bone scan (nuclear medicine) from April 2020 reviewed.  Images available in PACS; report however is not available.  It would seem that there is increased tracer uptake in the cervical spine.  Am not sure whether this is within normal limits or would be considered abnormal.  Patient thinks he's had a cervical CT in January at an ER in [facility].    Cervical AP lateral, right and left oblique, and odontoid view x-rays from 2017 reviewed.  These show degenerative changes or spondylosis, without evidence of fracture or  instability.  No significant deformity.    Impression:   1.  Chronic severe neck pain and stiffness, with headaches and bilateral hand tingling/numbness.  2.  Mild multilevel cervical spondylosis and stenosis, without evidence of spinal cord compression.    Plan:   Had a discussion with patient and wife.  He has had symptoms for over a year, progressively worsening, currently very disabling, had not worked since December, and does not think he is able to return to work with his previous level of symptoms.  At same time, feels that he has already tried and exhausted nonoperative treatment, including multiple injections, seeing many different providers and specialties, undergoing significant work-up.  Despite all these, continues to have significant disabling pain.    Unfortunately, given information at hand, I am unable to pinpoint exactly the cause of his ongoing pain.  MRI shows some findings including some degeneration of the disc, endplate inflammation, mild stenosis.  However these findings fail in comparison to the severity of his symptoms.  As such, I am unable to tell if these MRI findings are related to his ongoing symptoms.  I am also not certain if we are missing something in his case.    - Retrieve imaging reports from MRI scans (Feb 2020) and bone scan (April 2020).  - Retrieve cervical CT from [facility] Jan 2020.  - Retrieve injection reports from Dr. Chintan Mcdaniels (Pain Clinic, [facility]).  - Cervical ap-lat and flex-ext x-rays; may be done locally.  Patient lives in [facility], about 4 hours away.    Patient would like to make sure that he sorts out his insurance coverage before undergoing any more tests, as he cannot afford to pay for them himself.  Also looking into long-term disability application.  He asked me about this.  Unfortunately, I am likewise not familiar with this process.  I would be willing to try to help him as I could but it may be limited.  I told him that typically the  primary care physicians are more familiar with the process of applying for disability.    RTC virtual prn once above results obtained, so we could review and discuss potential next steps.    All questions and concerns were answered to the patient's apparent satisfaction before leaving the clinic.   Total visit time > 30 mins, > 50% counseling and coordination of care.    Respectfully,    Keith Bethea MD    Orthopaedic Spine Surgery  Dept Orthopaedic Surgery, [facility]  [phone] office, [phone] pager  [email or web address]

## 2020-07-16 NOTE — LETTER
"    7/16/2020         RE: Damian Lopez  [address] [facility] [address]        Dear Colleague,    Thank you for referring your patient, Damian Lopez, to the [facility]. Please see a copy of my visit note below.    Patient is evaluated today via billable video visit.    The patient has been notified of following:   \"This video visit will be conducted via a call between you and your physician/provider. We have found that certain health care needs can be provided without the need for an in-person physical exam.  This service lets us provide the care you need with a video conversation.  If a prescription is necessary we can send it directly to your pharmacy.  If lab work is needed we can place an order for that and you can then stop by our lab to have the test done at a later time.  If during the course of the call the physician/provider feels a video visit is not appropriate, you will not be charged for this service.\"     Physician has received verbal consent for a Video Visit from the patient.  Video software/leona used:  TRUECar  Video time:  3:10pm to 3:42pm  Originating Location (pt. Location): Home  Distant Location (provider location):  home       REASON FOR CONSULTATION: Consult (Cervicalgia)     REFERRING PHYSICIAN: Ned Lindsay   PCP:No primary care provider on file.    History of Present Illness:  40/m, ambidextrous, referred by Dr. Lindsay primarily for neck pain.    From Dr. Lindsay's note (6/30/2020), patient had already undergone significant and exhaustive nonoperative treatment, with evaluations by different specialties, and continues to have significant disabling pain:  - Per patient, has had approximately 8 injections into his neck, performed by Dr. Mcdaniels at a pain clinic in [facility].  This includes a cervical RAJ in 2019.  Also left C2-3 facet injection on 4/29/2020.  -Has had trigger point injections to the neck and upper back.  -Had been seen by PM&R, " neurosurgery, rheumatology and pain clinic.  - Has had multiple visits to urgent care and emergency room, with hospitalization for pain control on 1/20/2020.  - Has also had chiropractic care, physical therapy, traction.  - Has used tramadol, ibuprofen, Flexeril, gabapentin and amitriptyline.  Indicates intolerance to Topamax and Rizatriptan.    Per patient, had been experiencing significant neck pain x at least 1 year.  No trauma; no precipitating incident.  Came about gradually.  Getting worse over time.    Neck 70%, Arm 30% - tingling and numbness of bilateral fingertips to wrists.  Occasionally (3x over the past year), his hands/fingers would bend/cramp in an awkward position.  When he tries to move his neck, feels like there is gravel in there.  Worse:  Neck motion - also causes headaches.  Better:  nothing    Previous treament:  See above.  Per patient, the very first injection seemed to have helped signficantly, although worn off by the following day.  None of the abovementioned meds help.  Had previously tried opioids, but they did not help either; so currently not on opioids.    PSHx:  Had done construction and concrete x 25 years.  Had to stop working early December 2019 because of current symptoms (neck and back).  Was on short-term disability until 2 weeks ago.  Trying to get into long-term disability.  , lives with wife and 3 children (ages 12, 9 and 7 mos).  Started smoking about a month ago; approx 3-4 cigarettes/day.  Hardly ever drink EtOH.    PMHx:  (+) Hashimoto's      Neck Disability Index (NDI) Questionnaire    Neck Disability Index (NDI) 7/16/2020   Neck Disability Index: Count 10   NDI: Total Score = SUM (points for all 10 findings) 37   Neck Disability in Percent = (Total Score) / 50 * 100 74 (%)        ROS: A 12-point review of systems was completed and is negative except for otherwise noted above in the history of present illness.    Med Hx:  No past medical history on file.    Surg  "Hx:  No past surgical history on file.    Allergies:  Allergies   Allergen Reactions     Topiramate Other (See Comments)     \"almost had stroke the last time I took it\"     Rizatriptan Other (See Comments)     Loss of consciousness resulting in hospital admission       Meds:  Current Outpatient Medications   Medication     amitriptyline (ELAVIL) 50 MG tablet     cyclobenzaprine (FLEXERIL) 10 MG tablet     gabapentin (NEURONTIN) 100 MG capsule     OMEPRAZOLE PO     rOPINIRole (REQUIP) 5 MG tablet     thyroid (ARMOUR) 60 MG tablet     traMADol (ULTRAM) 50 MG tablet     No current facility-administered medications for this visit.        Fam Hx:  No family history on file.    P/S Hx:  Social History     Tobacco Use     Smoking status: Current Every Day Smoker     Smokeless tobacco: Never Used   Substance Use Topics     Alcohol use: Not on file       Physical Examination:    This was a video visit, so very limited exam could be performed.  On video, patient appeared alert, oriented x 3, cooperative, with coherent speech, and not in cardiorespiratory distress.  Able to respond to questions appropriately and follow instructions.          Imaging:   Cervical MRI from 2/25/2020 reviewed.  Mild multilevel cervical spondylosis, with mild spinal stenosis C5-6 and C6-7, milder at C3-4.  However, difficult to tell if this is symptomatic or not.  There is no spinal cord deformation; no cord signal change or myelomalacia.    Thoracic and lumbar MRI from 2/25/2020 reviewed.  Shows diffuse spondylosis or degenerative changes in the thoracic and lumbar spine.  However, no significant stenosis or evidence of spinal cord or nerve root impingement.    Cervical bone scan (nuclear medicine) from April 2020 reviewed.  Images available in PACS; report however is not available.  It would seem that there is increased tracer uptake in the cervical spine.  Am not sure whether this is within normal limits or would be considered abnormal.  Patient " thinks he's had a cervical CT in January at an ER in [facility].    Cervical AP lateral, right and left oblique, and odontoid view x-rays from 2017 reviewed.  These show degenerative changes or spondylosis, without evidence of fracture or instability.  No significant deformity.    Impression:   1.  Chronic severe neck pain and stiffness, with headaches and bilateral hand tingling/numbness.  2.  Mild multilevel cervical spondylosis and stenosis, without evidence of spinal cord compression.    Plan:   Had a discussion with patient and wife.  He has had symptoms for over a year, progressively worsening, currently very disabling, had not worked since December, and does not think he is able to return to work with his previous level of symptoms.  At same time, feels that he has already tried and exhausted nonoperative treatment, including multiple injections, seeing many different providers and specialties, undergoing significant work-up.  Despite all these, continues to have significant disabling pain.    Unfortunately, given information at hand, I am unable to pinpoint exactly the cause of his ongoing pain.  MRI shows some findings including some degeneration of the disc, endplate inflammation, mild stenosis.  However these findings fail in comparison to the severity of his symptoms.  As such, I am unable to tell if these MRI findings are related to his ongoing symptoms.  I am also not certain if we are missing something in his case.    - Retrieve imaging reports from MRI scans (Feb 2020) and bone scan (April 2020).  - Retrieve cervical CT from [facility] Jan 2020.  - Retrieve injection reports from Dr. Chintan Mcdaniels (Pain Clinic, [facility]).  - Cervical ap-lat and flex-ext x-rays; may be done locally.  Patient lives in [place], [place] hours away.    Patient would like to make sure that he sorts out his insurance coverage before undergoing any more tests, as he cannot afford to pay for them himself.  Also  looking into long-term disability application.  He asked me about this.  Unfortunately, I am likewise not familiar with this process.  I would be willing to try to help him as I could but it may be limited.  I told him that typically the primary care physicians are more familiar with the process of applying for disability.    RTC virtual prn once above results obtained, so we could review and discuss potential next steps.    All questions and concerns were answered to the patient's apparent satisfaction before leaving the clinic.   Total visit time > 30 mins, > 50% counseling and coordination of care.    Respectfully,    Keith Bethea MD    Orthopaedic Spine Surgery  Dept Orthopaedic Surgery, [facility]  [phone] office, [phone] pager  [email or web address]

## 2020-07-16 NOTE — NURSING NOTE
Reason For Visit:   Chief Complaint   Patient presents with     Consult     Cervicalgia       Primary MD: No primary care provider on file.    ?  No  Occupation Construction   Currently working? No.  Work status?  On disability.  Date of injury: Dec 2019 - started as neck & back, had RAJ. Didn't help in neck, only numbed. In back, has improved. Neck - unable to do lateral motion.     Date of surgery: NA  Type of surgery: NA   Smoker: Yes - started again a month ago.   Request smoking cessation information: No    There were no vitals taken for this visit.    Pain Assessment  Patient Currently in Pain: Yes  0-10 Pain Scale: 7  Primary Pain Location: Neck    Oswestry (BEENA) Questionnaire    No flowsheet data found.     Neck Disability Index (NDI) Questionnaire    Neck Disability Index (NDI) 7/16/2020   Neck Disability Index: Count 10   NDI: Total Score = SUM (points for all 10 findings) 37   Neck Disability in Percent = (Total Score) / 50 * 100 74 (%)       Promis 10 Assessment    PROMIS 10 7/16/2020   In general, would you say your health is: Fair   In general, would you say your quality of life is: Poor   In general, how would you rate your physical health? Fair   In general, how would you rate your mental health, including your mood and your ability to think? Good   In general, how would you rate your satisfaction with your social activities and relationships? Poor   In general, please rate how well you carry out your usual social activities and roles Poor   To what extent are you able to carry out your everyday physical activities such as walking, climbing stairs, carrying groceries, or moving a chair? A little   How often have you been bothered by emotional problems such as feeling anxious, depressed or irritable? Often   How would you rate your fatigue on average? Moderate   How would you rate your pain on average?   0 = No Pain  to  10 = Worst Imaginable Pain 8   In general, would you say your health  is: 2   In general, would you say your quality of life is: 1   In general, how would you rate your physical health? 2   In general, how would you rate your mental health, including your mood and your ability to think? 3   In general, how would you rate your satisfaction with your social activities and relationships? 1   In general, please rate how well you carry out your usual social activities and roles. (This includes activities at home, at work and in your community, and responsibilities as a parent, child, spouse, employee, friend, etc.) 1   To what extent are you able to carry out your everyday physical activities such as walking, climbing stairs, carrying groceries, or moving a chair? 2   In the past 7 days, how often have you been bothered by emotional problems such as feeling anxious, depressed, or irritable? 4   In the past 7 days, how would you rate your fatigue on average? 3   In the past 7 days, how would you rate your pain on average, where 0 means no pain, and 10 means worst imaginable pain? 8   Global Mental Health Score 7   Global Physical Health Score 9   PROMIS TOTAL - SUBSCORES 16      Leanne Huynh ATC

## 2020-09-01 ENCOUNTER — TELEPHONE (OUTPATIENT)
Dept: ORTHOPEDICS | Facility: CLINIC | Age: 40
End: 2020-09-01

## 2020-09-01 NOTE — TELEPHONE ENCOUNTER
[facility] Call Center    Phone Message    May a detailed message be left on voicemail: yes     Reason for Call: Other: [facility]     Action Taken: Other: [facility]    Travel Screening: Not Applicable

## 2020-10-01 ENCOUNTER — VIRTUAL VISIT (OUTPATIENT)
Dept: ORTHOPEDICS | Facility: CLINIC | Age: 40
End: 2020-10-01
Payer: COMMERCIAL

## 2020-10-01 DIAGNOSIS — M48.02 CERVICAL STENOSIS OF SPINE: ICD-10-CM

## 2020-10-01 DIAGNOSIS — M47.812 CERVICAL SPONDYLOSIS: Primary | ICD-10-CM

## 2020-10-01 PROCEDURE — 99214 OFFICE O/P EST MOD 30 MIN: CPT | Mod: 95 | Performed by: ORTHOPAEDIC SURGERY

## 2020-10-01 NOTE — PROGRESS NOTES
"VIDEO VISIT:  Patient is evaluated today via billable video visit.    The patient has been notified of following:   \"This video visit will be conducted via a call between you and your physician/provider. We have found that certain health care needs can be provided without the need for an in-person physical exam.  This service lets us provide the care you need with a video conversation.  If a prescription is necessary we can send it directly to your pharmacy.  If lab work is needed we can place an order for that and you can then stop by our lab to have the test done at a later time.  If during the course of the call the physician/provider feels a video visit is not appropriate, you will not be charged for this service.\"     Physician has received verbal consent for a Video Visit from the patient.  Video software/leona used:  [employer]  Video time:  9:30am to 9:55am  Originating Location (pt. Location): [place]  Distant Location (provider location):  [facility] [place]     Chief Complaint   Patient presents with     RECHECK     Discuss neck pain. Patient came to [place] to [facility] to have virtual visit.        Last Visit Date: 7/16/20  Previous Impression:  1.  Chronic severe neck pain and stiffness, with headaches and bilateral hand tingling/numbness.  2.  Mild multilevel cervical spondylosis and stenosis, without evidence of spinal cord compression.  Previous Plan:  Unfortunately, given information at hand, I am unable to pinpoint exactly the cause of his ongoing pain.  MRI shows some findings including some degeneration of the disc, endplate inflammation, mild stenosis.  However these findings fail in comparison to the severity of his symptoms.  As such, I am unable to tell if these MRI findings are related to his ongoing symptoms.  I am also not certain if we are missing something in his case.  - Retrieve imaging reports from MRI scans (Feb 2020) and bone scan (April 2020).  - " Retrieve cervical CT from [facility] Jan 2020.  - Retrieve injection reports from Dr. Chintan Mcdaniels (Pain Clinic, [facility]).  - Cervical ap-lat and flex-ext x-rays; may be done locally.  Patient lives in [place] [place] away.  Patient would like to make sure that he sorts out his insurance coverage before undergoing any more tests, as he cannot afford to pay for them himself.  Also looking into long-term disability application.  He asked me about this.  Unfortunately, I am likewise not familiar with this process.  I would be willing to try to help him as I could but it may be limited.  I told him that typically the primary care physicians are more familiar with the process of applying for disability.  RTC virtual prn once above results obtained, so we could review and discuss potential next steps.      S>  40 year old male, here for f/u.    Accompanied by wife.  Essentially similar symptoms as last visit.  If anything, he says symptoms continue to progress and get worse over time.  He is very unhappy.  He has also had symptoms for more than a year, and has failed exhaustive nonoperative treatment, including physical therapy, multiple injections, seeing different providers, and extensive work-up.    Continues to have significant neck and bilateral arm pain.  Neck 70%, arms 30%.    Saw rheumatologist (Dr. Chip Hubbard) with [facility] in [place], [place].  Per patient, Dr. Hubbard noted on PE that he had decreased sensation on the right side (leg, hip).    Per patient, he had seen a couple of neurologists already, and all they could diagnose him with was carpal tunnel syndrome, which does not explain his leg symptoms.    Currently taking ibuprofen 800 mg prn, helps somewhat.  Gabapentin.  Does not want opioids; took it before, made him loopy.      Neck Disability Index (NDI) Questionnaire    Neck Disability Index (NDI) 10/1/2020   Neck Disability Index: Count 10   NDI: Total Score = SUM (points for all 10  findings) 38   Neck Disability in Percent = (Total Score) / 50 * 100 76 (%)      Previous visit NDI  74%      Visual Analog Pain Scale  Neck Pain Scale 0-10: 7  Right arm pain: 3  Left arm pain: 3    PROMIS-10 Scores  Global Mental Health Score: (P) 9  Global Physical Health Score: (P) 10  PROMIS TOTAL - SUBSCORES: (P) 19    Physical Examination:    This was a video visit, so very limited exam could be performed.  On video, patient appeared alert, oriented x 3, cooperative, with coherent speech, and not in cardiorespiratory distress.  Able to respond to questions appropriately and follow instructions.    Imaging:    Cervical AP, lateral and flexion-extension views from 9/14/2020 uploaded to our PACS system and reviewed.  Shows multilevel degenerative changes or spondylosis, most advanced at C5-6 and C6-7 levels, with disc space narrowing, some endplate irregularity and osteophyte formation.  However, no evidence of instability.  No significant deformity.  No fracture or dislocation.  Overall, these are nonspecific findings that may or may not be symptomatic.    Assessment:    1.  Chronic severe neck pain and stiffness, with headaches and bilateral hand tingling/numbness, and right leg numbness.  2.  Mild multilevel cervical spondylosis and stenosis, without evidence of spinal cord compression.    Plan:    I had a good discussion with patient and his wife.  At his last visit, I outlined in my plan several things that would need to be retrieved.  It should be noted [place] and he has had extensive work-up already performed, but all of these were outside our health system.  Thus, they were not readily available to me.    Today, he returns, but none of the records that I have wanted retrieved had been retrieved.  Specifically, I have not seen any report pertaining to his cervical MRI from February 2020, nor a report pertaining to his bone scan from April 2020, nor any reports from injections  performed by Dr. Chintan Mcdaniels.  The only thing he had done among the different things I outlined, were cervical x-rays which I reviewed today.  However, these findings were nonspecific and did not necessarily tell us what or where the problem is, and whether there is a good surgical option available to him.    We will thus need to still retrieve all these things that I had wanted.  I apologized to the patient.  He should not be the one held responsible for retrieving these reports.  Our records gathering team should be able to retrieve these for us.    - Retrieve:    - Report of MRI scans from 2/25/2020 (images already in PACS).    - Report of bone scan from 4/14/2020 (images already in PACS).    - Injection reports from Dr. Chintan Mcdaniels (Pain Clinic, [facility]).  - Will also reach out to [facility] and ask if they could provide some reading on the MRI and bone scan images.  - Re disability paperwork, I advised that this continue to go through his PCP or non-surgical provider, as we had not performed any surgery on him (yet).  Once surgery is performed, then the surgeon could take over the responsibility re disability paperwork.    Will reach back out to patient re above and further recs.    TT 25 mins, > 50% CC.      Keith Bethea MD    Orthopaedic Spine Surgery  Dept Orthopaedic Surgery, [facility]  [phone] office, [phone] pager  [email or web address]

## 2020-10-01 NOTE — NURSING NOTE
Reason For Visit:   Chief Complaint   Patient presents with     RECHECK     Discuss neck pain. Patient came to [place] to [facility] to have virtual visit.        Primary MD: No primary care provider on file.      There were no vitals taken for this visit.    Pain Assessment  Patient Currently in Pain: Yes  0-10 Pain Scale: 7  Primary Pain Location: Neck      Neck Disability Index (NDI) Questionnaire    Neck Disability Index (NDI) 10/1/2020   Neck Disability Index: Count 10   NDI: Total Score = SUM (points for all 10 findings) 38   Neck Disability in Percent = (Total Score) / 50 * 100 76 (%)              Visual Analog Pain Scale  Neck Pain Scale 0-10: 7  Right arm pain: 3  Left arm pain: 3    Promis 10 Assessment    PROMIS 10 10/1/2020   In general, would you say your health is: Good   In general, would you say your quality of life is: Fair   In general, how would you rate your physical health? Good   In general, how would you rate your mental health, including your mood and your ability to think? Fair   In general, how would you rate your satisfaction with your social activities and relationships? Fair   In general, please rate how well you carry out your usual social activities and roles Good   To what extent are you able to carry out your everyday physical activities such as walking, climbing stairs, carrying groceries, or moving a chair? Moderately   How often have you been bothered by emotional problems such as feeling anxious, depressed or irritable? Sometimes   How would you rate your fatigue on average? Severe   How would you rate your pain on average?   0 = No Pain  to  10 = Worst Imaginable Pain 8   In general, would you say your health is: 3   In general, would you say your quality of life is: 2   In general, how would you rate your physical health? 3   In general, how would you rate your mental health, including your mood and your ability to think? 2   In general, how would you rate your  satisfaction with your social activities and relationships? 2   In general, please rate how well you carry out your usual social activities and roles. (This includes activities at home, at work and in your community, and responsibilities as a parent, child, spouse, employee, friend, etc.) 3   To what extent are you able to carry out your everyday physical activities such as walking, climbing stairs, carrying groceries, or moving a chair? 3   In the past 7 days, how often have you been bothered by emotional problems such as feeling anxious, depressed, or irritable? 3   In the past 7 days, how would you rate your fatigue on average? 4   In the past 7 days, how would you rate your pain on average, where 0 means no pain, and 10 means worst imaginable pain? 8   Global Mental Health Score 9   Global Physical Health Score 10   PROMIS TOTAL - SUBSCORES 19                Hayley Glaser LPN

## 2020-10-01 NOTE — LETTER
"    10/1/2020         RE: Damian Lopez  [address]  [facility] [address]        Dear Colleague,    Thank you for referring your patient, Damian Lopez, to the [facility]. Please see a copy of my visit note below.    VIDEO VISIT:  Patient is evaluated today via billable video visit.    The patient has been notified of following:   \"This video visit will be conducted via a call between you and your physician/provider. We have found that certain health care needs can be provided without the need for an in-person physical exam.  This service lets us provide the care you need with a video conversation.  If a prescription is necessary we can send it directly to your pharmacy.  If lab work is needed we can place an order for that and you can then stop by our lab to have the test done at a later time.  If during the course of the call the physician/provider feels a video visit is not appropriate, you will not be charged for this service.\"     Physician has received verbal consent for a Video Visit from the patient.  Video software/leona used:  Gametime  Video time:  9:30am to 9:55am  Originating Location (pt. Location): Home  Distant Location (provider location):  [facility]     Chief Complaint   Patient presents with     RECHECK     Discuss neck pain. Patient came to [place] to [facility] to have virtual visit.        Last Visit Date: 7/16/20  Previous Impression:  1.  Chronic severe neck pain and stiffness, with headaches and bilateral hand tingling/numbness.  2.  Mild multilevel cervical spondylosis and stenosis, without evidence of spinal cord compression.  Previous Plan:  Unfortunately, given information at hand, I am unable to pinpoint exactly the cause of his ongoing pain.  MRI shows some findings including some degeneration of the disc, endplate inflammation, mild stenosis.  However these findings fail in comparison to the " severity of his symptoms.  As such, I am unable to tell if these MRI findings are related to his ongoing symptoms.  I am also not certain if we are missing something in his case.  - Retrieve imaging reports from MRI scans (Feb 2020) and bone scan (April 2020).  - Retrieve cervical CT from [facility] Jan 2020.  - Retrieve injection reports from Dr. Chintan Mcdaniels (Pain Clinic, [facility]).  - Cervical ap-lat and flex-ext x-rays; may be done locally.  Patient lives in [place], about 4 hours away.  Patient would like to make sure that he sorts out his insurance coverage before undergoing any more tests, as he cannot afford to pay for them himself.  Also looking into long-term disability application.  He asked me about this.  Unfortunately, I am likewise not familiar with this process.  I would be willing to try to help him as I could but it may be limited.  I told him that typically the primary care physicians are more familiar with the process of applying for disability.  RTC virtual prn once above results obtained, so we could review and discuss potential next steps.      S>  40 year old male, here for f/u.    Accompanied by wife.  Essentially similar symptoms as last visit.  If anything, he says symptoms continue to progress and get worse over time.  He is very unhappy.  He has also had symptoms for more than a year, and has failed exhaustive nonoperative treatment, including physical therapy, multiple injections, seeing different providers, and extensive work-up.    Continues to have significant neck and bilateral arm pain.  Neck 70%, arms 30%.    Saw rheumatologist (Dr. Chip Hubbard) with [facility] in [place], [place].  Per patient, Dr. Hubbard noted on PE that he had decreased sensation on the right side (leg, hip).    Per patient, he had seen a couple of neurologists already, and all they could diagnose him with was carpal tunnel syndrome, which does not explain his leg symptoms.    Currently taking ibuprofen  800 mg prn, helps somewhat.  Gabapentin.  Does not want opioids; took it before, made him loopy.      Neck Disability Index (NDI) Questionnaire    Neck Disability Index (NDI) 10/1/2020   Neck Disability Index: Count 10   NDI: Total Score = SUM (points for all 10 findings) 38   Neck Disability in Percent = (Total Score) / 50 * 100 76 (%)      Previous visit NDI  74%      Visual Analog Pain Scale  Neck Pain Scale 0-10: 7  Right arm pain: 3  Left arm pain: 3    PROMIS-10 Scores  Global Mental Health Score: (P) 9  Global Physical Health Score: (P) 10  PROMIS TOTAL - SUBSCORES: (P) 19    Physical Examination:    This was a video visit, so very limited exam could be performed.  On video, patient appeared alert, oriented x 3, cooperative, with coherent speech, and not in cardiorespiratory distress.  Able to respond to questions appropriately and follow instructions.    Imaging:    Cervical AP, lateral and flexion-extension views from 9/14/2020 uploaded to our PACS system and reviewed.  Shows multilevel degenerative changes or spondylosis, most advanced at C5-6 and C6-7 levels, with disc space narrowing, some endplate irregularity and osteophyte formation.  However, no evidence of instability.  No significant deformity.  No fracture or dislocation.  Overall, these are nonspecific findings that may or may not be symptomatic.    Assessment:    1.  Chronic severe neck pain and stiffness, with headaches and bilateral hand tingling/numbness, and right leg numbness.  2.  Mild multilevel cervical spondylosis and stenosis, without evidence of spinal cord compression.    Plan:    I had a good discussion with patient and his wife.  At his last visit, I outlined in my plan several things that would need to be retrieved.  It should be noted [place], and he has had extensive work-up already performed, but all of these were outside our health system.  Thus, they were not readily available to me.    Today, he  returns, but none of the records that I have wanted retrieved had been retrieved.  Specifically, I have not seen any report pertaining to his cervical MRI from February 2020, nor a report pertaining to his bone scan from April 2020, nor any reports from injections performed by Dr. Chintan Mcdaniels.  The only thing he had done among the different things I outlined, were cervical x-rays which I reviewed today.  However, these findings were nonspecific and did not necessarily tell us what or where the problem is, and whether there is a good surgical option available to him.    We will thus need to still retrieve all these things that I had wanted.  I apologized to the patient.  He should not be the one held responsible for retrieving these reports.  Our records gathering team should be able to retrieve these for us.    - Retrieve:    - Report of MRI scans from 2/25/2020 (images already in PACS).    - Report of bone scan from 4/14/2020 (images already in PACS).    - Injection reports from Dr. Chintan Mcdaniels (Pain Clinic, [facility]).  - Will also reach out to [facility] and ask if they could provide some reading on the MRI and bone scan images.  - Re disability paperwork, I advised that this continue to go through his PCP or non-surgical provider, as we had not performed any surgery on him (yet).  Once surgery is performed, then the surgeon could take over the responsibility re disability paperwork.    Will reach back out to patient re above and further recs.    TT 25 mins, > 50% CC.      Keith Bethea MD    Orthopaedic Spine Surgery  Dept Orthopaedic Surgery, [facility]  [phone] office, [phone] pager  [email or web address]

## 2021-01-04 ENCOUNTER — HEALTH MAINTENANCE LETTER (OUTPATIENT)
Age: 41
End: 2021-01-04

## 2021-03-26 ENCOUNTER — MYC MEDICAL ADVICE (OUTPATIENT)
Dept: ORTHOPEDICS | Facility: CLINIC | Age: 41
End: 2021-03-26

## 2021-03-30 NOTE — TELEPHONE ENCOUNTER
See My Chart message & dictations of July 2020 & 10-1-2020 for plan.    I called pt & wife & let them know we have the imaging from [facility] of 3 Spine MRI's  2-25-20 & Bone scan of 4- but we still have not received the reports &  wants our Radiology staff here at [facility] to do a Read on the outside Cervical MRI & bone scan reports & then he needs a RTN appt with  to again discuss plan.  I told them  we are still allowed to do out of state Virtual appt with [facility] until JUne but dont know if that will change per Insurance.  We also need the  Imaging & reports of all the injections he has had with  pain clinic.  I asked them to sign QUYNH at both places to have all above sent to us & I provided  Our contact info.    They will F/U to check when we receive above & then make RTN appt.  They agreed.  Call back prn.  They agreed.  W.O./Celia Lundberg RN.

## 2021-05-25 ENCOUNTER — VIRTUAL VISIT (OUTPATIENT)
Dept: ORTHOPEDICS | Facility: CLINIC | Age: 41
End: 2021-05-25

## 2021-05-25 DIAGNOSIS — M47.816 LUMBAR SPONDYLOSIS: ICD-10-CM

## 2021-05-25 DIAGNOSIS — M47.812 CERVICAL SPONDYLOSIS: Primary | ICD-10-CM

## 2021-05-25 PROCEDURE — 99215 OFFICE O/P EST HI 40 MIN: CPT | Mod: 95 | Performed by: ORTHOPAEDIC SURGERY

## 2021-05-25 NOTE — LETTER
"    5/25/2021         RE: Damian Lopez  [address]  [facility] [address]        Dear Colleague,    Thank you for referring your patient, Damian Lopez, to the [facility]. Please see a copy of my visit note below.    VIRTUAL VISIT:  Patient is evaluated today via billable virtual visit.    The patient has been notified of following:   \"This virtual visit will be conducted via a call between you and your physician/provider. We have found that certain health care needs can be provided without the need for an in-person physical exam.  This service lets us provide the care you need with a virtual conversation.  If a prescription is necessary we can send it directly to your pharmacy.  If lab work is needed we can place an order for that and you can then stop by our lab to have the test done at a later time.  If during the course of the call the physician/provider feels a virtual visit is not appropriate, you will not be charged for this service.\"     Physician has received verbal consent for a Virtual Visit from the patient.  Platform used:  [email or web address] Video  Time:  3:48pm to 4:27pm  Originating Location (pt. Location): Home  Distant Location (provider location):  [facility]       Chief Complaint   Patient presents with     RECHECK     Neck pain and numbness. Records all in [employer].        Last Visit Date: 10/1/20  Previous Impression:  1.  Chronic severe neck pain and stiffness, with headaches and bilateral hand tingling/numbness, and right leg numbness.  2.  Mild multilevel cervical spondylosis and stenosis, without evidence of spinal cord compression.  Previous Plan:  Today, he returns, but none of the records that I have wanted retrieved had been retrieved.  Specifically, I have not seen any report pertaining to his cervical MRI from February 2020, nor a report pertaining to his bone scan from April 2020, nor any reports from injections " performed by Dr. Chintan Mcdaniels.  The only thing he had done among the different things I outlined, were cervical x-rays which I reviewed today.  However, these findings were nonspecific and did not necessarily tell us what or where the problem is, and whether there is a good surgical option available to him.  We will thus need to still retrieve all these things that I had wanted.  I apologized to the patient.  He should not be the one held responsible for retrieving these reports.  Our records gathering team should be able to retrieve these for us.  - Retrieve:    - Report of MRI scans from 2/25/2020 (images already in PACS).    - Report of bone scan from 4/14/2020 (images already in PACS).    - Injection reports from Dr. Chintan Mcdaniels (Pain Clinic, [facility]).  - Will also reach out to [facility] and ask if they could provide some reading on the MRI and bone scan images.  - Re disability paperwork, I advised that this continue to go through his PCP or non-surgical provider, as we had not performed any surgery on him (yet).  Once surgery is performed, then the surgeon could take over the responsibility re disability paperwork.  Will reach back out to patient re above and further recs.      S>  40 year old male, ambidextrous, here for f/u.    Neck pain 6/10. R>L.  Also with bad migraines (last attack a couple of weeks ago).    Low back pain 7/10.  Says is back pain has also been going on this whole time.  L>R.    If he walks for ~ 2 hours then sits down, then he could not stand back up because he could not move his legs L>R.    Per patient, he used to be very active.  He used to work in construction and welding.  He had to stop work about a couple of years ago because of ongoing symptoms.  Now, his quality of life is very poor.  He does not think he could go back to the same level of activity.    Injections c/o Dr. Mcdaniels:  12/17/19: C5-6 IL RAJ.  1/7/20: Bilateral SIJ injections with  steroid.    Tramadol  Cyclobenzaprine  Gabapentin  Amitryptiline  Ibuprofen 800  Used to be on hydrocodone, but stopped because it made him feel sick.  Tried morphine in the past, but it kept him awake.  Currently not on opioids.    Currently not following with anyone.  Had not seen Dr. Mcdaniels since Feb 2020.    PSHx:  Lives with wife.  Had not worked in ~ 2yrs.  Currently on disability check.  Used to work in construction and welding.      Neck Disability Index (NDI) Questionnaire    Neck Disability Index (NDI) 5/25/2021   Neck Disability Index: Count 10   NDI: Total Score = SUM (points for all 10 findings) 37   Neck Disability in Percent = (Total Score) / 50 * 100 74 (%)      NDI 10/1/20 76%      Visual Analog Pain Scale  Neck Pain Scale 0-10: 6  Right arm pain: 0  Left arm pain: 0    PROMIS-10 Scores  Global Mental Health Score: (P) 12  Global Physical Health Score: (P) 8  PROMIS TOTAL - SUBSCORES: (P) 20    Physical Examination:    This was a virtual visit, so very limited exam could be performed.  Patient seemed alert, oriented x 3, cooperative, with coherent speech, and not in distress.  Able to respond to questions appropriately and follow instructions.    Imaging:    No new imaging obtained today.    Assessment:    1.  Chronic severe neck pain and stiffness, with headaches and bilateral hand tingling/numbness, and right leg numbness.  2.  Mild multilevel cervical spondylosis and stenosis, without evidence of spinal cord compression.    Plan:    Had a good long discussion with patient and wife.  We again reviewed his previous imaging studies, including cervical, thoracic and lumbar MRI scans, x-rays, and cervical bone scan.  Unfortunately, after all these, I still really do not know exactly where his pain is/are coming from, and thus cannot recommend any specific surgical procedure that would likely be of benefit.    At the same time, I understand that he is miserable and desperate.  He has not worked in  more than 2 years, living on disability, and has a very poor quality of life.  He has pain both in his neck and his low back.  He also feels he has already exhausted all nonoperative treatment, including physical therapy, multiple injections and medications.  He had also been evaluated by rheumatology and neurology, and they do not have anything to offer.  Also very frustrating for the patient and his wife is the fact that nobody could give them a specific diagnosis or explain exactly what is going on in his body.    His cervical MRI shows some inflammatory changes left L2-3 facet joint.  This also shows increased tracer uptake on the bone scan.  However, his neck pain is more on the right side.  Thus, this finding is likely to be asymptomatic, and therefore performing a fusion at this level may not lead to significant benefit.    At the same time, his lumbar MRI shows advanced spondylosis with inflammatory changes at L5-S1 right greater than left.  However, his back symptoms are more on the left side.  Thus, I do not think that going after the L5-S1 level will be of significant benefit either.    The only thing I could think of is to repeat the advanced imaging studies, as these are already more than 1 year old.  I cannot guarantee that we will see something that would be clearly amenable to surgery.  However, I would like to leave no stone), given his level of desperation, and the fact that his quality of life is so poor.  He also has not had previous CT scans, so we would order these as well.    - Lumbar MRI and CT.  - Cervical MRI and CT.  He prefers to have the studies done locally.  We will have the images sent to us.    Virtual RTC afterwards to review and discuss further options.  At next visit, will need to fill out both cervical and lumbar questionnaires.  TT > 40 mins.      Keith Bethea MD    Orthopaedic Spine Surgery  Dept Orthopaedic Surgery, [facility]  [phone]  office, [phone] pager  [email or web address]

## 2021-05-26 ENCOUNTER — TELEPHONE (OUTPATIENT)
Dept: ORTHOPEDICS | Facility: CLINIC | Age: 41
End: 2021-05-26

## 2021-05-26 NOTE — TELEPHONE ENCOUNTER
[facility] Call Center    Phone Message    May a detailed message be left on voicemail: yes     Reason for Call: Other: Danni from [facility] is wondering if there were any kind of lab work, testing or scan done at patient's appointment from yesterday (05/25). Also what are the treatment plans?     Action Taken: Message routed to:  [facility] ([facility] Orthopedics    Travel Screening: Not Applicable

## 2021-05-27 NOTE — TELEPHONE ENCOUNTER
See phone message.  I called back & spoke to Jamie.   He states they are the Disability Insurance wondering about plan.  I told him we dont know yet because we ordered 2 MRIs & 2 CTs & then needs RTN appt. To determine plan.  Call back prn.  Celia Lundberg RN.

## 2021-10-10 ENCOUNTER — HEALTH MAINTENANCE LETTER (OUTPATIENT)
Age: 41
End: 2021-10-10

## 2022-01-30 ENCOUNTER — HEALTH MAINTENANCE LETTER (OUTPATIENT)
Age: 42
End: 2022-01-30

## 2022-09-24 ENCOUNTER — HEALTH MAINTENANCE LETTER (OUTPATIENT)
Age: 42
End: 2022-09-24

## 2023-05-08 ENCOUNTER — HEALTH MAINTENANCE LETTER (OUTPATIENT)
Age: 43
End: 2023-05-08

## 2023-11-10 NOTE — PROGRESS NOTES
"VIRTUAL VISIT:  Patient is evaluated today via billable virtual visit.    The patient has been notified of following:   \"This virtual visit will be conducted via a call between you and your physician/provider. We have found that certain health care needs can be provided without the need for an in-person physical exam.  This service lets us provide the care you need with a virtual conversation.  If a prescription is necessary we can send it directly to your pharmacy.  If lab work is needed we can place an order for that and you can then stop by our lab to have the test done at a later time.  If during the course of the call the physician/provider feels a virtual visit is not appropriate, you will not be charged for this service.\"     Physician has received verbal consent for a Virtual Visit from the patient.  Platform used:  [employer] Video  Time:  3:48pm to 4:27pm  Originating Location (pt. Location): [place]  Distant Location (provider location):  [facility] [place]       Chief Complaint   Patient presents with     RECHECK     Neck pain and numbness. Records all in [employer].        Last Visit Date: 10/1/20  Previous Impression:  1.  Chronic severe neck pain and stiffness, with headaches and bilateral hand tingling/numbness, and right leg numbness.  2.  Mild multilevel cervical spondylosis and stenosis, without evidence of spinal cord compression.  Previous Plan:  Today, he returns, but none of the records that I have wanted retrieved had been retrieved.  Specifically, I have not seen any report pertaining to his cervical MRI from February 2020, nor a report pertaining to his bone scan from April 2020, nor any reports from injections performed by Dr. Chintan Mcdaniels.  The only thing he had done among the different things I outlined, were cervical x-rays which I reviewed today.  However, these findings were nonspecific and did not necessarily tell us what or where the problem is, and whether there is " Provider text paged Re: Pt did not get a bariatric surgery recently,does not need to be on the bariatric FLD. Her surgery was about 7 years ago, she just had an extension to help with absorption. Please clarify    a good surgical option available to him.  We will thus need to still retrieve all these things that I had wanted.  I apologized to the patient.  He should not be the one held responsible for retrieving these reports.  Our records gathering team should be able to retrieve these for us.  - Retrieve:    - Report of MRI scans from 2/25/2020 (images already in PACS).    - Report of bone scan from 4/14/2020 (images already in PACS).    - Injection reports from Dr. Chintan Mcdaniels ([facility]).  - Will also reach out to [facility] and ask if they could provide some reading on the MRI and bone scan images.  - Re disability paperwork, I advised that this continue to go through his PCP or non-surgical provider, as we had not performed any surgery on him (yet).  Once surgery is performed, then the surgeon could take over the responsibility re disability paperwork.  Will reach back out to patient re above and further recs.      S>  40 year old male, ambidextrous, here for f/u.    Neck pain 6/10. R>L.  Also with bad migraines (last attack a couple of weeks ago).    Low back pain 7/10.  Says is back pain has also been going on this whole time.  L>R.    If he walks for ~ 2 hours then sits down, then he could not stand back up because he could not move his legs L>R.    Per patient, he used to be very active.  He used to work in construction and welding.  He had to stop work about a couple of years ago because of ongoing symptoms.  Now, his quality of life is very poor.  He does not think he could go back to the same level of activity.    Injections c/o Dr. Mcdaniels:  12/17/19: C5-6 IL RAJ.  1/7/20: Bilateral SIJ injections with steroid.    Tramadol  Cyclobenzaprine  Gabapentin  Amitryptiline  Ibuprofen 800  Used to be on hydrocodone, but stopped because it made him feel sick.  Tried morphine in the past, but it kept him awake.  Currently not on opioids.    Currently not following with anyone.  Had not seen   Ar[place]an since Feb 2020.    PSHx:  Lives with wife.  Had not worked in ~ 2yrs.  Currently on disability check.  Used to work in construction and welding.      Neck Disability Index (NDI) Questionnaire    Neck Disability Index (NDI) 5/25/2021   Neck Disability Index: Count 10   NDI: Total Score = SUM (points for all 10 findings) 37   Neck Disability in Percent = (Total Score) / 50 * 100 74 (%)      NDI 10/1/20 76%      Visual Analog Pain Scale  Neck Pain Scale 0-10: 6  Right arm pain: 0  Left arm pain: 0    PROMIS-10 Scores  Global Mental Health Score: (P) 12  Global Physical Health Score: (P) 8  PROMIS TOTAL - SUBSCORES: (P) 20    Physical Examination:    This was a virtual visit, so very limited exam could be performed.  Patient seemed alert, oriented x 3, cooperative, with coherent speech, and not in distress.  Able to respond to questions appropriately and follow instructions.    Imaging:    No new imaging obtained today.    Assessment:    1.  Chronic severe neck pain and stiffness, with headaches and bilateral hand tingling/numbness, and right leg numbness.  2.  Mild multilevel cervical spondylosis and stenosis, without evidence of spinal cord compression.    Plan:    Had a good long discussion with patient and wife.  We again reviewed his previous imaging studies, including cervical, thoracic and lumbar MRI scans, x-rays, and cervical bone scan.  Unfortunately, after all these, I still really do not know exactly where his pain is/are coming from, and thus cannot recommend any specific surgical procedure that would likely be of benefit.    At the same time, I understand that he is miserable and desperate.  He has not worked in more than 2 years, living on disability, and has a very poor quality of life.  He has pain both in his neck and his low back.  He also feels he has already exhausted all nonoperative treatment, including physical therapy, multiple injections and medications.  He had also been evaluated by  rheumatology and neurology, and they do not have anything to offer.  Also very frustrating for the patient and his wife is the fact that nobody could give them a specific diagnosis or explain exactly what is going on in his body.    His cervical MRI shows some inflammatory changes left L2-3 facet joint.  This also shows increased tracer uptake on the bone scan.  However, his neck pain is more on the right side.  Thus, this finding is likely to be asymptomatic, and therefore performing a fusion at this level may not lead to significant benefit.    At the same time, his lumbar MRI shows advanced spondylosis with inflammatory changes at L5-S1 right greater than left.  However, his back symptoms are more on the left side.  Thus, I do not think that going after the L5-S1 level will be of significant benefit either.    The only thing I could think of is to repeat the advanced imaging studies, as these are already more than 1 year old.  I cannot guarantee that we will see something that would be clearly amenable to surgery.  However, I would like to leave no stone), given his level of desperation, and the fact that his quality of life is so poor.  He also has not had previous CT scans, so we would order these as well.    - Lumbar MRI and CT.  - Cervical MRI and CT.  He prefers to have the studies done locally.  We will have the images sent to us.    Virtual RTC afterwards to review and discuss further options.  At next visit, will need to fill out both cervical and lumbar questionnaires.  TT > 40 mins.      Keith Bethea MD    Orthopaedic Spine Surgery  Dept Orthopaedic Surgery, [facility]  [phone] office, [phone] pager  [email or web address]